# Patient Record
Sex: FEMALE | Race: WHITE | NOT HISPANIC OR LATINO | URBAN - METROPOLITAN AREA
[De-identification: names, ages, dates, MRNs, and addresses within clinical notes are randomized per-mention and may not be internally consistent; named-entity substitution may affect disease eponyms.]

---

## 2017-01-16 ENCOUNTER — IMPORTED ENCOUNTER (OUTPATIENT)
Dept: URBAN - METROPOLITAN AREA CLINIC 1 | Facility: CLINIC | Age: 82
End: 2017-01-16

## 2017-01-16 PROBLEM — Z96.1: Noted: 2017-01-16

## 2017-01-16 PROBLEM — H40.1123: Noted: 2017-01-16

## 2017-01-16 PROBLEM — H40.1112: Noted: 2017-01-16

## 2017-01-16 PROBLEM — H04.123: Noted: 2017-01-16

## 2017-01-16 PROBLEM — H16.143: Noted: 2017-01-16

## 2017-01-16 PROCEDURE — 92012 INTRM OPH EXAM EST PATIENT: CPT

## 2017-01-16 PROCEDURE — 92133 CPTRZD OPH DX IMG PST SGM ON: CPT

## 2017-01-16 NOTE — PATIENT DISCUSSION
1.  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 No progression by OCT OU. Continue Latanoprost OU QHS. 2.  DORY w/increased PEK OU- Progressive. The increase of artificial tears OU TID were recommended. 3.  Pseudophakia OU- Doing well. 4.  Return for an appointment for a 10 in 4 months with Dr. Gilbert Hartley.

## 2017-01-26 ENCOUNTER — TELEPHONE (OUTPATIENT)
Dept: INTERNAL MEDICINE CLINIC | Age: 82
End: 2017-01-26

## 2017-05-18 ENCOUNTER — IMPORTED ENCOUNTER (OUTPATIENT)
Dept: URBAN - METROPOLITAN AREA CLINIC 1 | Facility: CLINIC | Age: 82
End: 2017-05-18

## 2017-05-18 PROBLEM — H40.1112: Noted: 2017-05-18

## 2017-05-18 PROBLEM — H01.004: Noted: 2017-05-18

## 2017-05-18 PROBLEM — H04.123: Noted: 2017-05-18

## 2017-05-18 PROBLEM — H16.143: Noted: 2017-05-18

## 2017-05-18 PROBLEM — Z96.1: Noted: 2017-05-18

## 2017-05-18 PROBLEM — H40.1123: Noted: 2017-05-18

## 2017-05-18 PROBLEM — H01.001: Noted: 2017-05-18

## 2017-05-18 PROCEDURE — 92015 DETERMINE REFRACTIVE STATE: CPT

## 2017-05-18 PROCEDURE — 92012 INTRM OPH EXAM EST PATIENT: CPT

## 2017-05-18 NOTE — PATIENT DISCUSSION
1.  Open Angle Glaucoma Moderate (0.3) OD/Advanced (0.85 OS): IOP stable OU. Continue Latanoprost OU QHS. 2.  DORY w/increased PEK OU- Progressive. Continue ATs TID OU Routinely. 3.  Pseudophakia OU- (Standard OU) H/o YAG Cap OD 4. Anterior Blepharitis OU- Begin Hot Compresses/ Lid Scrubs x5 minutes daily. 5.  Recent dx of Brain Tumor- Discussed with patient to have PCP send annual reports. Return for an appointment in 4 mo 30 VF 24-2 OU with Dr. Buzz Lagunas.

## 2019-11-18 NOTE — PROCEDURE NOTE: SURGICAL
MR #: [id number]<PD /><br />PREOPERATIVE DIAGNOSIS: Cataract, left eye.<br /><br />POSTOPERATIVE DIAGNOSIS: Same.<br /><br />OPERATIVE PROCEDURE: Phacoemulsification with +19.0 diopter Ramon &amp; Ramon AAB00, PC-IOL, left eye.<br /><br />PROCEDURE:&nbsp; Following sedation, Philippe Simon EVERARDO administered topical anesthesia in the form of lidocaine 2% gel as per the anesthetic record. <br /><br />Prior to commencing surgery patient identification, surgical procedure, site, and side were confirmed by Dr. Consuelo Balbuena. &nbsp; The patient was then prepped with Betadine and draped with sterile drapes. A lid speculum was placed and the side port incision prepared. &nbsp; 0.5 cc of Epi-Shugarcaine was instilled and the anterior chamber entered at the temporal 180&deg; limbus in a single plane fashion employing a 2.7 mm trapezoid irving and ring fixation. Viscoelastic was placed, a circular capsulorhexis performed, hydrodissection accomplished and the nucleus emulsified within the posterior chamber. Cortex was aspirated with the I/A handpiece, the posterior capsule polished and viscoelastic instilled to distend the capsular sac. A +19.0 diopter Ramon &amp; Katherleen Binder was placed into the bag under direct visualization without difficulty employing the microinjector. Viscoelastic was aspirated with the I/A handpiece and the anterior chamber pressurized with BSS. The incision was tested for leaks and none were found. The patient returned to the holding area having tolerated the procedure extremely well and without complication. <br /><br />Epi-shugarcaine consists of a mixture of 1cc epinephrine MPF 1:1000, 0.75 cc 4% lidocaine MPF and 2.25 cc BSS.  <br /><br /><br />

## 2019-11-19 NOTE — PATIENT DISCUSSION
Cataract surgery has been performed in the first eye and activities of daily living are still impaired. The patient would like to proceed with cataract surgery in the second eye as scheduled. The patient elects Basic OD, goal of Yael.

## 2019-11-25 NOTE — PROCEDURE NOTE: SURGICAL
MR #: [id number]<KJ /><br />PREOPERATIVE DIAGNOSIS: Cataract, right eye.<br /><br />POSTOPERATIVE DIAGNOSIS: Same.<br /><br />OPERATIVE PROCEDURE: Phacoemulsification with +18.5 diopter Ramon &amp; Ramon AAB00, PC-IOL, right eye.<br /><br />PROCEDURE:&nbsp; Following sedation, Mere Stoddard CRNA administered topical anesthesia in the form of lidocaine 2% gel as per the anesthetic record. <br /><br />Prior to commencing surgery patient identification, surgical procedure, site, and side were confirmed by Dr. Merlin Becker. &nbsp; The patient was then prepped with Betadine and draped with sterile drapes. A lid speculum was placed and the side port incision prepared. &nbsp; 0.5 cc of Epi-Shugarcaine was instilled and the anterior chamber entered at the temporal 180&deg; limbus in a single plane fashion employing a 2.7 mm trapezoid irving and ring fixation. Viscoelastic was placed, a circular capsulorhexis performed, hydrodissection accomplished and the nucleus emulsified within the posterior chamber. Cortex was aspirated with the I/A handpiece, the posterior capsule polished and viscoelastic instilled to distend the capsular sac. A +18.5 diopter Ramon &amp; Julio Pique was placed into the bag under direct visualization without difficulty employing the microinjector. Viscoelastic was aspirated with the I/A handpiece and the anterior chamber pressurized with BSS. The incision was tested for leaks and none were found. The patient returned to the holding area having tolerated the procedure extremely well and without complication. <br /><br />Epi-shugarcaine consists of a mixture of 1cc epinephrine MPF 1:1000, 0.75 cc 4% lidocaine MPF and 2.25 cc BSS.  <br /><br /><br />

## 2020-01-14 NOTE — PROCEDURE NOTE: CLINICAL
PROCEDURE NOTE: Sub-Tenon’s Triamcinolone OD. Diagnosis: Mild Cystoid Macular Edema. Prior to injection, risks/benefits/alternatives discussed including infection, loss of vision, hemorrhage, cataract, glaucoma, elevated intraocular pressure and lid swelling. Betadine prep was performed. Sub-Tenon’s injection of Triamcinolone 40 mg/1 ml was given. Patient tolerated procedure well. There were no complications. Post-op instructions given. Tracking # *. Lot #: [id number]. Expiration Date: [id number]:00. Patient given office phone number/answering service number and advised to call immediately should there be an increase in floaters or redness, loss of vision or pain, or should they have any other questions or concerns. Inventory Id: brooke Soria

## 2020-09-22 NOTE — PATIENT DISCUSSION
Discussed in detail re: nature of condition, dry vs wet AMD, AREDS.  Slightly Increased PED, no conversion to wet.

## 2021-01-21 NOTE — PATIENT DISCUSSION
Patient understands there is an increased risk of corneal edema after cataract surgery. Statement Selected

## 2021-10-27 NOTE — PATIENT DISCUSSION
Advised regular use of Amsler grid. Detail Level: Detailed General Sunscreen Counseling: I recommended a broad spectrum sunscreen with a SPF of 30 or higher.  I explained that SPF 30 sunscreens block approximately 97 percent of the sun's harmful rays.  Sunscreens should be applied at least 15 minutes prior to expected sun exposure and then every 2 hours after that as long as sun exposure continues. If swimming or exercising sunscreen should be reapplied every 45 minutes to an hour after getting wet or sweating.  One ounce, or the equivalent of a shot glass full of sunscreen, is adequate to protect the skin not covered by a bathing suit. I also recommended a lip balm with a sunscreen as well. Sun protective clothing can be used in lieu of sunscreen but must be worn the entire time you are exposed to the sun's rays.ABCD,s and E of melanoma reviewed

## 2022-03-12 ASSESSMENT — TONOMETRY
OD_IOP_MMHG: 17
OD_IOP_MMHG: 15
OS_IOP_MMHG: 17
OS_IOP_MMHG: 15

## 2022-03-12 ASSESSMENT — VISUAL ACUITY
OS_CC: 20/60-1
OD_CC: 20/40
OS_SC: 20/40
OS_SC: 20/30
OD_SC: 20/30
OD_SC: 20/20

## 2022-10-24 NOTE — PATIENT DISCUSSION
----- Message from Marti Figueroa sent at 10/24/2022  8:35 AM CDT -----  Type: Patient Call Back    Who called: Self     What is the request in detail: PT is asking why her medication was denied PT is asking for a call back Today    Can the clinic reply by [facility]? No     Would the patient rather a call back or a response via [facility]? Call     Best call back number:v [phone]        Discussed presence of mild ERM. Recommended observation for now. Can consider surgical intervention in the future if the ERM progresses and the patient becomes more symptomatic.